# Patient Record
Sex: FEMALE | Race: WHITE
[De-identification: names, ages, dates, MRNs, and addresses within clinical notes are randomized per-mention and may not be internally consistent; named-entity substitution may affect disease eponyms.]

---

## 2018-07-22 ENCOUNTER — HOSPITAL ENCOUNTER (EMERGENCY)
Dept: HOSPITAL 68 - ERH | Age: 63
LOS: 1 days | End: 2018-07-23
Payer: COMMERCIAL

## 2018-07-22 VITALS — WEIGHT: 126 LBS | HEIGHT: 63 IN | BODY MASS INDEX: 22.32 KG/M2

## 2018-07-22 DIAGNOSIS — R19.7: ICD-10-CM

## 2018-07-22 DIAGNOSIS — R10.32: Primary | ICD-10-CM

## 2018-07-22 DIAGNOSIS — R11.0: ICD-10-CM

## 2018-07-22 LAB
ABSOLUTE GRANULOCYTE CT: 2.5 /CUMM (ref 1.4–6.5)
BASOPHILS # BLD: 0 /CUMM (ref 0–0.2)
BASOPHILS NFR BLD: 0.7 % (ref 0–2)
EOSINOPHIL # BLD: 0.3 /CUMM (ref 0–0.7)
EOSINOPHIL NFR BLD: 4.5 % (ref 0–5)
ERYTHROCYTE [DISTWIDTH] IN BLOOD BY AUTOMATED COUNT: 12.8 % (ref 11.5–14.5)
GRANULOCYTES NFR BLD: 41.9 % (ref 42.2–75.2)
HCT VFR BLD CALC: 38.8 % (ref 37–47)
LYMPHOCYTES # BLD: 2.7 /CUMM (ref 1.2–3.4)
MCH RBC QN AUTO: 31.6 PG (ref 27–31)
MCHC RBC AUTO-ENTMCNC: 33.5 G/DL (ref 33–37)
MCV RBC AUTO: 94.2 FL (ref 81–99)
MONOCYTES # BLD: 0.5 /CUMM (ref 0.1–0.6)
PLATELET # BLD: 226 /CUMM (ref 130–400)
PMV BLD AUTO: 8.2 FL (ref 7.4–10.4)
RED BLOOD CELL CT: 4.12 /CUMM (ref 4.2–5.4)
WBC # BLD AUTO: 6 /CUMM (ref 4.8–10.8)

## 2018-07-22 NOTE — ED GI/GU/ABDOMINAL COMPLAINT
History of Present Illness
 
General
Chief Complaint: General Adult
Stated Complaint: SEVERE GROIN PAIN, NAUSEA PER PT
Source: patient, family
Exam Limitations: no limitations
 
Vital Signs & Intake/Output
Vital Signs & Intake/Output
 Vital Signs
 
 
Date Time Temp Pulse Resp B/P B/P Pulse O2 O2 Flow FiO2
 
     Mean Ox Delivery Rate 
 
 0120 98.3 60 16 103/57  96 Room Air  
 
 0000 97.8 74 18 115/72  97 Room Air  
 
 2255 97.7 83 18 125/83  99 Room Air  
 
 
 ED Intake and Output
 
 
  0000  1200
 
Intake Total 0 
 
Output Total  
 
Balance 0 
 
   
 
Intake, Oral 0 
 
Patient 126 lb 
 
Weight  
 
 
 
Allergies
Coded Allergies:
Penicillins (Intermediate, HIVES, FACIAL SWELLING 10/20/16)
Sulfa (Sulfonamide Antibiotics) (Intermediate, DYSPNEA 10/20/16)
erythromycin base (Intermediate, GASTRITIS 10/20/16)
fluconazole (Intermediate, VERTIGO 10/20/16)
pentazocine (From TALWIN) (Intermediate, HIVES, ITCHING, FACIAL DISTOTRTION AND 
NUMBNESS 3 DAYS  10/20/16)
tetracycline (Intermediate, HIVES AND ITCHING 10/20/16)
vancomycin (Intermediate, RED MAN SYNDROME  10/20/16)
amoxicillin (Mild, HIVES AND ITCHING 10/20/16)
ampicillin (Mild, HIVES AND ITCHING 10/20/16)
doxycycline (Mild, HIVES AND ITCHING 10/20/16)
meperidine (From DEMEROL) (Mild, NAUSEA AND DRY HEAVES 10/20/16)
naproxen (From NAPROSYN) (Mild, FACIAL SWELLING, FACIAL PARALYSIS ON LEFT SIDE 
FOR TWO DAYS 10/20/16)
phenol (Mild, SKIN BLISTERING AND CHEMICAL BURN 10/20/16)
procaine (From NOVOCAIN) (Mild, JITTERS AND SHAKES 10/20/16)
iodine (RED BLOTHINESS OVER BODY, FEELING FAINT 10/20/16)
prednisone (Mild, GLUCOSE  10/20/16)
Uncoded Allergies:
DYES (Severe, BREATHING DIFFICULTIES AND NHIVES 10/20/16)
LEVODROMERAN (Severe, HALLUCINATIONS 10/20/16)
EES (Mild, GASTRITIS 10/20/16)
 
Reconcile Medications
Ondansetron (Zofran Odt) 4 MG TAB.RAPDIS   1 TAB SL TID PRN nausea
Oxycodone HCl/Acetaminophen (Percocet 5-325 MG Tablet) 5 MG-325 MG TABLET   1 
TAB PO BID PRN pain
Tylenol With Codeine (Tylenol With Codeine #3 Tablet) 300 MG-30 MG TABLET   1 
TAB PO Q4-6 PRN PAIN
 
Triage Note:
PT TO ER C/C 9/10 LEFT GROIN PAIN X 1 DAY. PT HAS
HX OF CYST REMOVAL TO SAME AREA LAST YEAR.
+NAUSEA. DENIES URINARY S/S
Triage Nurses Notes Reviewed? yes
Pregnant? N
Is pt currently breastfeeding? No
Onset: Gradual
Duration: hour(s):
Timing: single episode today
Quality/Severity: severe
Severity Numbers: 9
Location: groin, left lower quadrant
Radiation: flank
HPI:
63YO female with hx of left groin abscess requiring surigcal drainage presents 
to ED complaining of LLQ/left groin pain beginning early this morning. Pain 
described as severe, 10/10, left groin radiating toward left side. Pain has 
gradually worsened since onset. Patient states she had a similar pain relating 
to a groin abscess which started as an infected hair follicle. She has had no 
skin changes in the groin or abdominal area this time. PAtient reports 
associated nausea and one episode of diarrhea today. The patient denies fevers, 
dysuria, hematuria, vomiting.
(Sirisha Masters)
 
Past History
 
Travel History
Traveled to [place] past 21 day No
 
Medical History
Any Pertinent Medical History? see below for history
Cardiovascular: VERTIGO
Gastrointestinal: irritable bowel syndrome, HIATEL HERNIA
Hepatic: FATTY LIVER
Renal: RENAL CYST
Musculoskeletal: O.P.
 
Surgical History
Surgical History: non-contributory
 
Psychosocial History
What is your primary language English
Tobacco Use: Quit >30 days ago
 
Family History
Hx Contributory? No
(Sirisha Masters)
 
Review of Systems
 
Review of Systems
Constitutional:
Reports: no symptoms. 
EENTM:
Reports: no symptoms. 
Respiratory:
Reports: no symptoms. 
Cardiovascular:
Reports: no symptoms. 
GI:
Reports: see HPI. 
Genitourinary:
Reports: see HPI. 
Musculoskeletal:
Reports: no symptoms. 
Skin:
Reports: no symptoms. 
Neurological/Psychological:
Reports: no symptoms. 
Hematologic/Endocrine:
Reports: no symptoms. 
Immunologic/Allergic:
Reports: no symptoms. 
All Other Systems: Reviewed and Negative
(Sirisha Masters)
 
Physical Exam
 
Physical Exam
General Appearance: well developed/nourished, no apparent distress, alert, awake
Head: atraumatic, normal appearance
Eyes:
Bilateral: normal appearance. 
Ears, Nose, Throat, Mouth: hearing grossly normal
Neck: normal inspection, supple, full range of motion
Respiratory: normal breath sounds, no respiratory distress, lungs clear
Cardiovascular: regular rate/rhythm
Gastrointestinal: normal bowel sounds, soft, no organomegaly, LLQ tenderness
Back: normal inspection, normal range of motion, no CVA tenderness
Extremities: normal range of motion
Neurologic/Psych: no motor/sensory deficits, awake, alert, oriented x 3, CNs II-
XII nml as tested
Skin: intact, normal color, warm/dry
 
Core Measures
ACS in differential dx? No
Sepsis Present: No
Sepsis Focused Exam Completed? No
(Jeana FARRIS,Sirisha Carl)
 
Progress
Differential Diagnosis: appendicitis, bowel obstruction, diverticulitis, 
gastritis, hernia, inflamm bowel dis, kidney stone, ovarian cyst, ovarian 
torsion, SBO, UTI/pyelo
Plan of Care:
 Orders
 
 
Procedure Date/time Status
 
URINALYSIS 2246 Complete
 
LIPASE 2246 Complete
 
HEPATIC FUNCTION PANEL 2246 Complete
 
CBC WITHOUT DIFFERENTIAL 2246 Complete
 
BASIC METABOLIC PANEL 2246 Complete
 
AMYLASE 2246 Complete
 
 
 Laboratory Tests
 
 
 
18 0022:
Urine Color YEL, Urine Clarity CLEAR, Urine pH 7.0, Ur Specific Gravity 1.010, 
Urine Protein NEG, Urine Ketones NEG, Urine Nitrite NEG, Urine Bilirubin NEG, 
Urine Urobilinogen 0.2, Ur Leukocyte Esterase NEG, Ur Microscopic EXAM NOT 
REQUIRED, Urine Hemoglobin NEG, Urine Glucose NEG
 
18 2301:
Anion Gap 11, Estimated GFR > 60, BUN/Creatinine Ratio 26.3  H, Glucose 105  H, 
Calcium 9.3, Total Bilirubin 0.4, Direct Bilirubin 0.1, AST 34, ALT 54  H, 
Alkaline Phosphatase 71, Total Protein 6.9, Albumin 4.2, Amylase 61, Lipase 149,
CBC w Diff NO MAN DIFF REQ, RBC 4.12  L, MCV 94.2, MCH 31.6  H, MCHC 33.5, RDW 
12.8, MPV 8.2, Gran % 41.9  L, Lymphocytes % 44.5, Monocytes % 8.4, Eosinophils 
% 4.5, Basophils % 0.7, Absolute Granulocytes 2.5, Absolute Lymphocytes 2.7, 
Absolute Monocytes 0.5, Absolute Eosinophils 0.3, Absolute Basophils 0
 
 
Patient medicated with IV morphine, CT scan is pending.
 
Following IV morphine dose patient reports left sided body numbness.  Neurologic
exam performed at this time shows cranial nerves are intact without focal 
neurologic deficit, sensation is intact bilaterally, strength is 5/5 
bilaterally.  Patient was offered CT head imaging however declines.  She states 
that she feels much improvement in her pain following IV morphine.
 
CT scan of abdomen shows no acute abnormality, labs are stable, the patient's 
vital signs are stable.  No evidence of urine infection.  Patient's symptoms 
have been present for several hours today, associated with nausea and diarrhea, 
she is 62, low suspicion for acute ovarian torsion given these findings and no 
abnormal findings detected with ovaries and CT imaging.  Patient to follow up 
with GI.  She was given strict return precautions.  The patient agrees with plan
of care.  Discussed with Dr. Crenshaw agrees with this plan.
Diagnostic Imaging:
Viewed by Me: CT Scan.  Discussed w/RAD: CT Scan. 
Radiology Impression: PATIENT: MELANY CUMMINGS  MEDICAL RECORD NO: [id number] PRESENT 
AGE: 62  PATIENT ACCOUNT NO: [id number] : 55  LOCATION: [facility] [place] ORDERING 
PHYSICIAN: Sirisha FARRIS     SERVICE DATE:  EXAM TYPE: CAT -
CT ABD & PELVIS W/O IV CONTRAS EXAMINATION: CT ABDOMEN AND PELVIS WITHOUT 
CONTRAST CLINICAL INFORMATION: Left groin pain. Nausea. COMPARISON: None. 
TECHNIQUE: Contiguous axial thin section helical images of the abdomen and 
pelvis were performed without oral or IV contrast. The data set was reformatted 
in the coronal and sagittal planes and reviewed on an independent workstation. 
DLP: 286 mGy-cm. FINDINGS: The visualized lung bases are clear. The visualized 
portions of the heart are unremarkable. The liver is of normal size and 
attenuation without focal lesions nor intrahepatic biliary ductal dilation. A 
normal gallbladder is identified. There is no wall thickening or discernible 
pericholecystic fluid. The spleen, pancreas, adrenal glands are unremarkable. 
Both kidneys are of normal size and attenuation without hydronephrosis or 
nephrolithiasis. There is no abdominal free fluid. There is neither mesenteric 
nor retroperitoneal lymphadenopathy. Normal unopacified loops of small and large
bowel are identified. There is no pelvic free fluid. The urinary bladder is 
unremarkable. There is neither pelvic nor inguinal lymphadenopathy. Bone windows
: Neither sclerotic nor lytic bone lesions are identified. IMPRESSION: No 
evidence for acute abdominal or pelvic inflammatory or infectious processes. 
DICTATED BY: Dewayne Ayon MD  DATE/TIME DICTATED:18 
:RAD.[email or web address]  DATE/TIME TRANSCRIBED:18 CONFIDENTIAL, 
DO NOT COPY WITHOUT APPROPRIATE AUTHORIZATION.  <Electronically signed in Other 
Vendor System>                                                                  
                     SIGNED BY: Dewayne Ayon MD 18
Initial ED EKG: none
(Jeana FARRIS,Sirisha Carl)
 
Departure
 
Departure
Disposition: HOME OR SELF CARE
Condition: Stable
Clinical Impression
Primary Impression: Abdominal pain
Qualifiers:  Abdominal location: left lower quadrant Qualified Code: R10.32 - 
Left lower quadrant pain
Secondary Impressions: Paresthesias
Referrals:
Kalpana RAMÍREZ,Richard MARCOS (PCP/Family)
 
Corazon RAMÍREZ,Blake SWANN
 
Additional Instructions:
Take Zofran as prescribed as needed for nausea.  Take Percocet as prescribed as 
needed for pain.  Follow-up with gastroenterologist regarding your abdominal 
pain.  Return with worsening symptoms or concerns including worsening abdominal 
pain, high fevers, vomiting..
 
Please note that there might be incidental findings in your evaluation that are 
unrelated to the current emergency department visit.  Please notify your primary
care doctor about this emergency department visit in order to obtain and review 
all of the testing performed so that these incidental findings can be monitored 
as needed.
 
If you had an x-ray performed, please understand that some fractures may not be 
seen on the initial set of x-rays.  If your symptoms persist you might need a 
repeat set of x-rays to check for such a fracture.
 
If you had a laceration evaluated, please understand that foreign bodies such as
glass or wood may not be visible to the naked eye or on plain x-rays.  If the 
wound becomes red, swollen, increasingly more painful or if there is any 
drainage from the wound, please have it reevaluated by a physician for the 
possibility of a retained foreign body.
 
If you're unable to follow up as outlined in the discharge instructions please 
return to the emergency department.
 
Thank you for choosing the [facility] Emergency Department for your care.
It was a pleasure to serve you today.
Departure Forms:
Customer Survey
General Discharge Information
Prescriptions:
Current Visit Scripts
Ondansetron (Zofran Odt) 1 TAB SL TID PRN nausea 
     #10 TAB 
 
Oxycodone HCl/Acetaminophen (Percocet 5-325 MG Tablet) 1 TAB PO BID PRN pain 
     #10 TAB 
 
 
(Jeana FARRIS,Sirisha Carl)
 
PA/NP Co-Sign Statement
Statement:
ED Attending supervision documentation-
 
[] I saw and evaluated the patient. I have also reviewed all the pertinent lab 
results and diagnostic results. I agree with the findings and the plan of care 
as documented in the PA's/NP's documentation. 
 
[x] I have reviewed the ED Record and agree with the PA's/NP's documentation.
 
[] Additions or exceptions (if any) to the PAs/NP's note and plan are 
summarized below:
[]
 
(Flower RAMÍREZ,rGegg HUGHES)

## 2018-07-23 VITALS — SYSTOLIC BLOOD PRESSURE: 103 MMHG | DIASTOLIC BLOOD PRESSURE: 57 MMHG

## 2018-07-23 NOTE — CT SCAN REPORT
EXAMINATION:
CT ABDOMEN AND PELVIS WITHOUT CONTRAST
 
CLINICAL INFORMATION:
Left groin pain. Nausea.
 
COMPARISON:
None.
 
TECHNIQUE:
Contiguous axial thin section helical images of the abdomen and pelvis were
performed without oral or IV contrast. The data set was reformatted in the
coronal and sagittal planes and reviewed on an independent workstation.
 
DLP: 286 mGy-cm.
 
FINDINGS:
The visualized lung bases are clear. The visualized portions of the heart are
unremarkable.
 
The liver is of normal size and attenuation without focal lesions nor
intrahepatic biliary ductal dilation. A normal gallbladder is identified.
There is no wall thickening or discernible pericholecystic fluid.
 
The spleen, pancreas, adrenal glands are unremarkable.
 
Both kidneys are of normal size and attenuation without hydronephrosis or
nephrolithiasis.
 
There is no abdominal free fluid. There is neither mesenteric nor
retroperitoneal lymphadenopathy.
 
Normal unopacified loops of small and large bowel are identified. There is no
pelvic free fluid. The urinary bladder is unremarkable. There is neither
pelvic nor inguinal lymphadenopathy.
 
Bone windows: Neither sclerotic nor lytic bone lesions are identified.
 
IMPRESSION:
 
No evidence for acute abdominal or pelvic inflammatory or infectious
processes.